# Patient Record
Sex: MALE | Race: WHITE
[De-identification: names, ages, dates, MRNs, and addresses within clinical notes are randomized per-mention and may not be internally consistent; named-entity substitution may affect disease eponyms.]

---

## 2019-01-12 ENCOUNTER — HOSPITAL ENCOUNTER (EMERGENCY)
Dept: HOSPITAL 10 - FTE | Age: 49
Discharge: HOME | End: 2019-01-12
Payer: MEDICAID

## 2019-01-12 ENCOUNTER — HOSPITAL ENCOUNTER (EMERGENCY)
Dept: HOSPITAL 91 - FTE | Age: 49
Discharge: HOME | End: 2019-01-12
Payer: SELF-PAY

## 2019-01-12 VITALS — DIASTOLIC BLOOD PRESSURE: 78 MMHG | HEART RATE: 85 BPM | SYSTOLIC BLOOD PRESSURE: 144 MMHG | RESPIRATION RATE: 18 BRPM

## 2019-01-12 VITALS — HEIGHT: 60 IN | WEIGHT: 198.42 LBS | BODY MASS INDEX: 38.95 KG/M2

## 2019-01-12 DIAGNOSIS — E11.9: ICD-10-CM

## 2019-01-12 DIAGNOSIS — A09: Primary | ICD-10-CM

## 2019-01-12 PROCEDURE — 99283 EMERGENCY DEPT VISIT LOW MDM: CPT

## 2019-01-12 NOTE — ERD
ER Documentation


Chief Complaint


Chief Complaint





diarrhea x 3 days, vomiting





HPI


48-year-old male, with history of diabetes, presents to the emergency 


department, complaining of 3 days with diarrhea, vomiting, subjective fever and 


general malaise.  The patient describes the diarrhea as watery, green, with some


mucus, approximately 4 episodes per day.  The vomiting is usually postprandial, 


3/day.  He denies chills, no abdominal pain.  There is a history of a possible 


suspicious food poisoning.  No medications taken at this time.





ROS


All systems reviewed and are negative except as per history of present illness.





Medications


Home Meds


Active Scripts


Acetaminophen* (Tylenol*) 325 Mg Tablet, 2 TAB PO Q8 PRN for PAIN AND OR 


ELEVATED TEMP, #20 TAB


   Prov:CHRISTIANO HORTON MD         1/12/19


Ranitidine Hcl* (Zantac*) 150 Mg Tablet, 150 MG PO BID PRN for EPIGASTRIC PAIN, 


#10 TAB


   Prov:CHRISTIANO HORTON MD         1/12/19


Ciprofloxacin Hcl* (Ciprofloxacin Hcl*) 250 Mg Tablet, 250 MG PO BID for 3 Days,


#6 TAB


   Prov:CHRISTIANO HORTON MD         1/12/19


Loratadine* (Loratadine*) 10 Mg Tablet, 10 MG PO DAILY, #30 TAB


   Prov:MIKE DAVIS NP         7/30/16


Ketoconazole* (Ketoconazole*) 60 Gm Cream.gm., 1 APPLIC TOP BID for 14 Days, EA


   Prov:MIKE DAVIS NP         7/30/16


Hydrocodone Bit-Acetaminophen* (Norco*) 5-325 Mg Tab, 1 TAB PO Q4H PRN for PAIN,


#7 TAB


   Prov:NAMARKUS WIGGINS C         7/18/16


Ibuprofen* (Motrin*) 600 Mg Tab, 600 MG PO Q6, #30 TAB


   Prov:NAMARKUS C         7/18/16





Allergies


Allergies:  


Coded Allergies:  


     No Known Allergy (Unverified , 7/30/16)





PMhx/Soc


History of Surgery:  Yes (ankle sx)


Anesthesia Reaction:  No


Hx Neurological Disorder:  No


Hx Respiratory Disorders:  No


Hx Cardiac Disorders:  No


Hx Psychiatric Problems:  No


Hx Miscellaneous Medical Probl:  No


Hx Alcohol Use:  No


Hx Substance Use:  No


Hx Tobacco Use:  No





FmHx


Family History:  diabetes; 


   No coronary disease





Physical Exam


Vitals





Vital Signs


  Date      Temp  Pulse  Resp  B/P (MAP)   Pulse Ox  O2          O2 Flow    FiO2


Time                                                 Delivery    Rate


   1/12/19  98.1     85    18      144/78        99


     07:55                          (100)





Physical Exam


Const:   No acute distress


Head:   Atraumatic 


Eyes:    Normal Conjunctiva


ENT:    Normal External Ears, Nose and Mouth.


Neck:               Full range of motion. No meningismus.


Resp:   Clear to auscultation bilaterally


Cardio:   Regular rate and rhythm, no murmurs


Abd:    Soft, non tender, non distended. Normal bowel sounds


Skin:   No petechiae or rashes


Back:   No midline or flank tenderness


Ext:    No cyanosis, or edema


Neur:   Awake and alert


Psych:    Normal Mood and Affect





Procedures/MDM


Physical exam unremarkable, patient in no distress, hydrated, adequate oral 


intake, abdomen, soft, nontender, no peritoneal signs.


Differential diagnosis include but not limited to: gastrointestinal infection 


bacterial/viral, UTI, appendicitis, colitis, food poisoning, food intolerance.  


Low suspicion for acute abdomen


Physical examination and clinical presentation consistent most likely with 


infectious Gastroenteritis.


During the ED course the patient remained stable, asymptomatic.


Clinical impression discussed with the patient who agrees with management. The 


patient is stable to be discharged home, Some side effects of prescribed 


medications (headache, rash, nausea, vomiting, diarrhea, interactions with other


medications) were reviewed.





The patient requires a follow up with the primary care provider in the next 48h.


 If symptoms persist, worsen or new symptoms develop, then patient should return


to the ED immediately.





Disclaimer: Inadvertent spelling and grammatical errors are likely due to 


EHR/dictation software use and do not reflect on the overall quality of patient 


care. Also, please note that the electronic time recorded on this note does not 


necessarily reflect the actual time of the patient encounter.





Departure


Diagnosis:  


   Primary Impression:  


   Infectious gastroenteritis


Condition:  Stable





Additional Instructions:  


Muchas dirk por [facility] para fan servicio.





Esperamos que en fan visita a la mian de emergencia fan problema medico haya sido 


solucionado y que se sienta mucho mejor. 





Para estar seguros que fan mejoria sigue en proceso, le pedimos el favor de hacer


sherly alxei de seguimiento medico con fan doctor primario en los proximos 2-4 sher.





Lleve con usted estos documentos y las medicinas recetadas.





Si clay sintomas empeoran, NO SE ESPERE, por favor regrese a mian de emergencia 


INMEDIATAMENTE.





En rosamaria que usted no tenga un mdico de atencin primaria:


Llame al mdico o clnica comunitaria de referencia que aparece abajo maxine 


las horas de consultorio para hacer sherly alexi para que le vean.





CLINICAS:


[facility]  [phone] [address] [facility]  [phone] [address] [facility] [phone] [address] [facility]  [phone]


[address] [facility]   [phone] [address] [facility] [phone] 


[address] CHRISTIANO GORMAN MD      Jan 12, 2019 08:32